# Patient Record
Sex: MALE | Race: WHITE
[De-identification: names, ages, dates, MRNs, and addresses within clinical notes are randomized per-mention and may not be internally consistent; named-entity substitution may affect disease eponyms.]

---

## 2020-05-14 ENCOUNTER — HOSPITAL ENCOUNTER (INPATIENT)
Dept: HOSPITAL 15 - ER | Age: 65
LOS: 4 days | Discharge: HOME | DRG: 183 | End: 2020-05-18
Attending: INTERNAL MEDICINE | Admitting: INTERNAL MEDICINE
Payer: COMMERCIAL

## 2020-05-14 VITALS — WEIGHT: 242.51 LBS | HEIGHT: 74 IN | BODY MASS INDEX: 31.12 KG/M2

## 2020-05-14 VITALS — DIASTOLIC BLOOD PRESSURE: 91 MMHG | SYSTOLIC BLOOD PRESSURE: 156 MMHG

## 2020-05-14 DIAGNOSIS — M48.02: ICD-10-CM

## 2020-05-14 DIAGNOSIS — Z79.899: ICD-10-CM

## 2020-05-14 DIAGNOSIS — Z90.49: ICD-10-CM
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
## 2020-05-14 DIAGNOSIS — J98.11: ICD-10-CM

## 2020-05-14 DIAGNOSIS — I10: ICD-10-CM

## 2020-05-14 DIAGNOSIS — M06.9: ICD-10-CM

## 2020-05-14 DIAGNOSIS — F17.200: ICD-10-CM

## 2020-05-14 DIAGNOSIS — S01.81XA: ICD-10-CM

## 2020-05-14 DIAGNOSIS — S22.42XA: Primary | ICD-10-CM

## 2020-05-14 DIAGNOSIS — N28.1: ICD-10-CM

## 2020-05-14 DIAGNOSIS — D75.89: ICD-10-CM

## 2020-05-14 DIAGNOSIS — Z90.81: ICD-10-CM

## 2020-05-14 DIAGNOSIS — K59.00: ICD-10-CM

## 2020-05-14 DIAGNOSIS — Z80.6: ICD-10-CM

## 2020-05-14 DIAGNOSIS — J96.00: ICD-10-CM

## 2020-05-14 DIAGNOSIS — R65.10: ICD-10-CM
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
## 2020-05-14 DIAGNOSIS — D72.829: ICD-10-CM

## 2020-05-14 LAB
ALBUMIN SERPL-MCNC: 3.6 G/DL (ref 3.4–5)
ALP SERPL-CCNC: 114 U/L (ref 45–117)
ALT SERPL-CCNC: 40 U/L (ref 16–61)
ANION GAP SERPL CALCULATED.3IONS-SCNC: 7 MMOL/L (ref 5–15)
BILIRUB SERPL-MCNC: 0.6 MG/DL (ref 0.2–1)
BUN SERPL-MCNC: 22 MG/DL (ref 7–18)
BUN/CREAT SERPL: 24.7
CALCIUM SERPL-MCNC: 8.6 MG/DL (ref 8.5–10.1)
CHLORIDE SERPL-SCNC: 109 MMOL/L (ref 98–107)
CHOLEST SERPL-MCNC: 169 MG/DL (ref ?–200)
CO2 SERPL-SCNC: 23 MMOL/L (ref 21–32)
GLUCOSE SERPL-MCNC: 103 MG/DL (ref 74–106)
HCT VFR BLD AUTO: 41 % (ref 41–53)
HDLC SERPL-MCNC: 46 MG/DL (ref 40–59)
HGB BLD-MCNC: 14.1 G/DL (ref 13.5–17.5)
MCH RBC QN AUTO: 34.8 PG (ref 28–32)
MCV RBC AUTO: 101.1 FL (ref 80–100)
NRBC BLD QL AUTO: 0.1 %
POTASSIUM SERPL-SCNC: 3.9 MMOL/L (ref 3.5–5.1)
PROT SERPL-MCNC: 7.1 G/DL (ref 6.4–8.2)
SODIUM SERPL-SCNC: 139 MMOL/L (ref 136–145)
TRIGL SERPL-MCNC: 92 MG/DL (ref ?–150)

## 2020-05-14 PROCEDURE — 80053 COMPREHEN METABOLIC PANEL: CPT

## 2020-05-14 PROCEDURE — 36415 COLL VENOUS BLD VENIPUNCTURE: CPT

## 2020-05-14 PROCEDURE — 82607 VITAMIN B-12: CPT

## 2020-05-14 PROCEDURE — 84132 ASSAY OF SERUM POTASSIUM: CPT

## 2020-05-14 PROCEDURE — 71250 CT THORAX DX C-: CPT

## 2020-05-14 PROCEDURE — 97530 THERAPEUTIC ACTIVITIES: CPT

## 2020-05-14 PROCEDURE — 83735 ASSAY OF MAGNESIUM: CPT

## 2020-05-14 PROCEDURE — 70450 CT HEAD/BRAIN W/O DYE: CPT

## 2020-05-14 PROCEDURE — 82805 BLOOD GASES W/O2 SATURATION: CPT

## 2020-05-14 PROCEDURE — 85730 THROMBOPLASTIN TIME PARTIAL: CPT

## 2020-05-14 PROCEDURE — 84484 ASSAY OF TROPONIN QUANT: CPT

## 2020-05-14 PROCEDURE — 82550 ASSAY OF CK (CPK): CPT

## 2020-05-14 PROCEDURE — 87040 BLOOD CULTURE FOR BACTERIA: CPT

## 2020-05-14 PROCEDURE — 82746 ASSAY OF FOLIC ACID SERUM: CPT

## 2020-05-14 PROCEDURE — 85025 COMPLETE CBC W/AUTO DIFF WBC: CPT

## 2020-05-14 PROCEDURE — 80048 BASIC METABOLIC PNL TOTAL CA: CPT

## 2020-05-14 PROCEDURE — 80061 LIPID PANEL: CPT

## 2020-05-14 PROCEDURE — 72125 CT NECK SPINE W/O DYE: CPT

## 2020-05-14 PROCEDURE — 71046 X-RAY EXAM CHEST 2 VIEWS: CPT

## 2020-05-14 PROCEDURE — 83036 HEMOGLOBIN GLYCOSYLATED A1C: CPT

## 2020-05-14 PROCEDURE — 84100 ASSAY OF PHOSPHORUS: CPT

## 2020-05-14 PROCEDURE — 70551 MRI BRAIN STEM W/O DYE: CPT

## 2020-05-14 PROCEDURE — 80307 DRUG TEST PRSMV CHEM ANLYZR: CPT

## 2020-05-14 PROCEDURE — 81001 URINALYSIS AUTO W/SCOPE: CPT

## 2020-05-14 PROCEDURE — 36600 WITHDRAWAL OF ARTERIAL BLOOD: CPT

## 2020-05-14 PROCEDURE — 97116 GAIT TRAINING THERAPY: CPT

## 2020-05-14 PROCEDURE — 85610 PROTHROMBIN TIME: CPT

## 2020-05-14 PROCEDURE — 93005 ELECTROCARDIOGRAM TRACING: CPT

## 2020-05-14 PROCEDURE — 97163 PT EVAL HIGH COMPLEX 45 MIN: CPT

## 2020-05-14 PROCEDURE — 84146 ASSAY OF PROLACTIN: CPT

## 2020-05-14 RX ADMIN — SODIUM CHLORIDE SCH MLS/HR: 9 INJECTION, SOLUTION INTRAVENOUS at 21:09

## 2020-05-14 RX ADMIN — SODIUM CHLORIDE SCH MLS/HR: 0.9 INJECTION, SOLUTION INTRAVENOUS at 19:15

## 2020-05-14 RX ADMIN — MORPHINE SULFATE PRN MG: 2 INJECTION, SOLUTION INTRAMUSCULAR; INTRAVENOUS at 21:07

## 2020-05-14 NOTE — NUR
Telemetry admit from ER

RADAMESRANJIT admitted to Telemetry unit after SBAR received.  Patient oriented to AURELIO CHOI, RN primary RN, west wing, 277,A, and unit policies regarding patient care 
and visiting hours. Patient now on continuous telemetry monitoring, tele box # 70 and 
telemetry reading on arrival to unit is normal sinus rhythm 72. weighed by bedscale and 
encouraged to call if they need something. All questions and concerns addressed, patient 
verbalized understanding. Will continue to monitor.

## 2020-05-15 VITALS — SYSTOLIC BLOOD PRESSURE: 140 MMHG | DIASTOLIC BLOOD PRESSURE: 71 MMHG

## 2020-05-15 VITALS — SYSTOLIC BLOOD PRESSURE: 121 MMHG | DIASTOLIC BLOOD PRESSURE: 64 MMHG

## 2020-05-15 VITALS — DIASTOLIC BLOOD PRESSURE: 76 MMHG | SYSTOLIC BLOOD PRESSURE: 134 MMHG

## 2020-05-15 VITALS — SYSTOLIC BLOOD PRESSURE: 130 MMHG | DIASTOLIC BLOOD PRESSURE: 73 MMHG

## 2020-05-15 VITALS — DIASTOLIC BLOOD PRESSURE: 72 MMHG | SYSTOLIC BLOOD PRESSURE: 141 MMHG

## 2020-05-15 LAB
ALBUMIN SERPL-MCNC: 3.4 G/DL (ref 3.4–5)
ALCOHOL, URINE: < 3 MG/DL (ref 0–5)
ALP SERPL-CCNC: 99 U/L (ref 45–117)
ALT SERPL-CCNC: 34 U/L (ref 16–61)
AMPHETAMINES UR QL SCN: NEGATIVE
ANION GAP SERPL CALCULATED.3IONS-SCNC: 3 MMOL/L (ref 5–15)
APTT PPP: 28.2 SEC (ref 23.64–32.05)
BARBITURATES UR QL SCN: NEGATIVE
BENZODIAZ UR QL SCN: NEGATIVE
BILIRUB SERPL-MCNC: 1.2 MG/DL (ref 0.2–1)
BUN SERPL-MCNC: 19 MG/DL (ref 7–18)
BUN/CREAT SERPL: 20
BZE UR QL SCN: NEGATIVE
CALCIUM SERPL-MCNC: 8.2 MG/DL (ref 8.5–10.1)
CANNABINOIDS UR QL SCN: NEGATIVE
CHLORIDE SERPL-SCNC: 108 MMOL/L (ref 98–107)
CO2 SERPL-SCNC: 26 MMOL/L (ref 21–32)
GLUCOSE SERPL-MCNC: 103 MG/DL (ref 74–106)
HCT VFR BLD AUTO: 40.3 % (ref 41–53)
HGB BLD-MCNC: 13.5 G/DL (ref 13.5–17.5)
INR PPP: 1.03 (ref 0.9–1.15)
MAGNESIUM SERPL-MCNC: 2.4 MG/DL (ref 1.6–2.6)
MCH RBC QN AUTO: 34 PG (ref 28–32)
MCV RBC AUTO: 101.8 FL (ref 80–100)
NRBC BLD QL AUTO: 0 %
OPIATES UR QL SCN: POSITIVE
PCP UR QL SCN: NEGATIVE
POTASSIUM SERPL-SCNC: 3.9 MMOL/L (ref 3.5–5.1)
PROT SERPL-MCNC: 6.8 G/DL (ref 6.4–8.2)
SODIUM SERPL-SCNC: 137 MMOL/L (ref 136–145)

## 2020-05-15 RX ADMIN — SODIUM CHLORIDE SCH MLS/HR: 0.9 INJECTION, SOLUTION INTRAVENOUS at 10:15

## 2020-05-15 RX ADMIN — SODIUM CHLORIDE SCH MLS/HR: 0.9 INJECTION, SOLUTION INTRAVENOUS at 05:09

## 2020-05-15 RX ADMIN — SODIUM CHLORIDE SCH MLS/HR: 9 INJECTION, SOLUTION INTRAVENOUS at 01:28

## 2020-05-15 RX ADMIN — SODIUM CHLORIDE SCH MLS/HR: 9 INJECTION, SOLUTION INTRAVENOUS at 08:33

## 2020-05-15 RX ADMIN — ONDANSETRON HYDROCHLORIDE PRN MG: 2 INJECTION, SOLUTION INTRAMUSCULAR; INTRAVENOUS at 17:40

## 2020-05-15 RX ADMIN — DOCUSATE SODIUM PRN MG: 100 CAPSULE, LIQUID FILLED ORAL at 21:48

## 2020-05-15 RX ADMIN — SODIUM CHLORIDE SCH MLS/HR: 9 INJECTION, SOLUTION INTRAVENOUS at 17:41

## 2020-05-15 RX ADMIN — MORPHINE SULFATE PRN MG: 2 INJECTION, SOLUTION INTRAMUSCULAR; INTRAVENOUS at 19:19

## 2020-05-15 RX ADMIN — ONDANSETRON HYDROCHLORIDE PRN MG: 2 INJECTION, SOLUTION INTRAMUSCULAR; INTRAVENOUS at 23:25

## 2020-05-15 RX ADMIN — HYDROCODONE BITARTRATE AND ACETAMINOPHEN PRN TAB: 5; 325 TABLET ORAL at 13:15

## 2020-05-15 RX ADMIN — MORPHINE SULFATE PRN MG: 2 INJECTION, SOLUTION INTRAMUSCULAR; INTRAVENOUS at 23:20

## 2020-05-15 RX ADMIN — HYDROCODONE BITARTRATE AND ACETAMINOPHEN PRN TAB: 5; 325 TABLET ORAL at 17:40

## 2020-05-15 RX ADMIN — SODIUM CHLORIDE SCH MLS/HR: 9 INJECTION, SOLUTION INTRAVENOUS at 20:52

## 2020-05-15 RX ADMIN — MORPHINE SULFATE PRN MG: 2 INJECTION, SOLUTION INTRAMUSCULAR; INTRAVENOUS at 01:18

## 2020-05-15 RX ADMIN — MORPHINE SULFATE PRN MG: 2 INJECTION, SOLUTION INTRAMUSCULAR; INTRAVENOUS at 05:46

## 2020-05-15 RX ADMIN — HYDROCODONE BITARTRATE AND ACETAMINOPHEN PRN TAB: 5; 325 TABLET ORAL at 21:47

## 2020-05-15 RX ADMIN — MORPHINE SULFATE PRN MG: 2 INJECTION, SOLUTION INTRAMUSCULAR; INTRAVENOUS at 10:30

## 2020-05-15 RX ADMIN — ENOXAPARIN SODIUM SCH MG: 40 INJECTION SUBCUTANEOUS at 08:33

## 2020-05-15 RX ADMIN — SODIUM CHLORIDE SCH MLS/HR: 0.9 INJECTION, SOLUTION INTRAVENOUS at 00:50

## 2020-05-15 RX ADMIN — MORPHINE SULFATE PRN MG: 2 INJECTION, SOLUTION INTRAMUSCULAR; INTRAVENOUS at 15:03

## 2020-05-15 NOTE — NUR
IV removal

Left hand IV DC'd with clean sterile technique, catheter fully intact. Pressure dressing 
applied to site. Patient tolerated well.



NEW IV

New 22G IV placed on the right hand, after one attempt, patient tolerated well. No trauma to 
site. Will continue to monitor.

## 2020-05-15 NOTE — NUR
Opening Shift Note

Assumed care of patient, awake and alert, oriented x 4, clear speech, follows direction. On 
oxygen at 2L via NC with even and unlabored respirations. Patient was able to return 
demonstration with proper use of incentive spirometer, 1500ml inspire volume. No S/S of 
distress/SOB.Steri-strips to left lateral eye CDI. Bed in lowest locked position with side 
rails up x 2 and call light within reach. Instructed on POC and to call for assist PRN, will 
continue to monitor for changes Q1hr and PRN.

## 2020-05-15 NOTE — NUR
Pain

Patient complaining of generalized pain in body but primarily pain focused on left thoracic 
area. Per patient pain is constant, stabbing, and pressure like. Patient rates pain 6/10. 
Patient has been repositioned for comfort.[place] 5/352 PO given for pain, will reassess pain 
in one hour.

## 2020-05-15 NOTE — NUR
Closing note



Patient alert and orientated x4. No SOB or distress noted. Endorsed care to Day shift RN.

## 2020-05-15 NOTE — NUR
Assumed care

Patient in bed AOx4, no s/s of distress or SOB noted. Patient updated on POC and to call for 
assistance as needed, all questions and concerns addressed, patient verbalized 
understanding. Will continue care.

## 2020-05-15 NOTE — NUR
Pain

Patient complaining of generalized pain in body but primarily pain focused on left thoracic 
area. Per patient pain is constant, stabbing, and pressure like. Patient rates pain 10/10. 
Patient has been repositioned for comfort. Morphine 1mg IV given for pain, will reassess 
pain in one hour.

## 2020-05-16 VITALS — DIASTOLIC BLOOD PRESSURE: 89 MMHG | SYSTOLIC BLOOD PRESSURE: 158 MMHG

## 2020-05-16 VITALS — SYSTOLIC BLOOD PRESSURE: 124 MMHG | DIASTOLIC BLOOD PRESSURE: 64 MMHG

## 2020-05-16 VITALS — DIASTOLIC BLOOD PRESSURE: 74 MMHG | SYSTOLIC BLOOD PRESSURE: 138 MMHG

## 2020-05-16 VITALS — SYSTOLIC BLOOD PRESSURE: 142 MMHG | DIASTOLIC BLOOD PRESSURE: 79 MMHG

## 2020-05-16 VITALS — SYSTOLIC BLOOD PRESSURE: 158 MMHG | DIASTOLIC BLOOD PRESSURE: 89 MMHG

## 2020-05-16 VITALS — DIASTOLIC BLOOD PRESSURE: 69 MMHG | SYSTOLIC BLOOD PRESSURE: 135 MMHG

## 2020-05-16 LAB
ANION GAP SERPL CALCULATED.3IONS-SCNC: 6 MMOL/L (ref 5–15)
BUN SERPL-MCNC: 15 MG/DL (ref 7–18)
BUN/CREAT SERPL: 23.1
CALCIUM SERPL-MCNC: 8.4 MG/DL (ref 8.5–10.1)
CHLORIDE SERPL-SCNC: 106 MMOL/L (ref 98–107)
CO2 SERPL-SCNC: 24 MMOL/L (ref 21–32)
GLUCOSE SERPL-MCNC: 131 MG/DL (ref 74–106)
HCT VFR BLD AUTO: 42.8 % (ref 41–53)
HGB BLD-MCNC: 13.9 G/DL (ref 13.5–17.5)
MAGNESIUM SERPL-MCNC: 2.4 MG/DL (ref 1.6–2.6)
MCH RBC QN AUTO: 33.5 PG (ref 28–32)
MCV RBC AUTO: 103 FL (ref 80–100)
NRBC BLD QL AUTO: 0 %
POTASSIUM SERPL-SCNC: 3.6 MMOL/L (ref 3.5–5.1)
SODIUM SERPL-SCNC: 136 MMOL/L (ref 136–145)

## 2020-05-16 RX ADMIN — SODIUM CHLORIDE SCH MLS/HR: 9 INJECTION, SOLUTION INTRAVENOUS at 14:15

## 2020-05-16 RX ADMIN — Medication SCH ML: at 23:02

## 2020-05-16 RX ADMIN — ONDANSETRON HYDROCHLORIDE PRN MG: 2 INJECTION, SOLUTION INTRAMUSCULAR; INTRAVENOUS at 17:03

## 2020-05-16 RX ADMIN — CYANOCOBALAMIN SCH MCG: 1000 INJECTION, SOLUTION INTRAMUSCULAR at 10:15

## 2020-05-16 RX ADMIN — MORPHINE SULFATE PRN MG: 2 INJECTION, SOLUTION INTRAMUSCULAR; INTRAVENOUS at 04:18

## 2020-05-16 RX ADMIN — SODIUM CHLORIDE SCH MLS/HR: 9 INJECTION, SOLUTION INTRAVENOUS at 02:31

## 2020-05-16 RX ADMIN — SODIUM CHLORIDE SCH MLS/HR: 9 INJECTION, SOLUTION INTRAVENOUS at 08:58

## 2020-05-16 RX ADMIN — Medication SCH ML: at 18:51

## 2020-05-16 RX ADMIN — MORPHINE SULFATE PRN MG: 2 INJECTION, SOLUTION INTRAMUSCULAR; INTRAVENOUS at 08:49

## 2020-05-16 RX ADMIN — HYDROCODONE BITARTRATE AND ACETAMINOPHEN PRN TAB: 5; 325 TABLET ORAL at 17:08

## 2020-05-16 RX ADMIN — ENOXAPARIN SODIUM SCH MG: 40 INJECTION SUBCUTANEOUS at 10:08

## 2020-05-16 RX ADMIN — DOCUSATE SODIUM PRN MG: 100 CAPSULE, LIQUID FILLED ORAL at 21:02

## 2020-05-16 RX ADMIN — MORPHINE SULFATE PRN MG: 2 INJECTION, SOLUTION INTRAMUSCULAR; INTRAVENOUS at 14:32

## 2020-05-16 RX ADMIN — ONDANSETRON HYDROCHLORIDE PRN MG: 2 INJECTION, SOLUTION INTRAMUSCULAR; INTRAVENOUS at 04:17

## 2020-05-16 NOTE — NUR
Incentive spirometer (IS) education provided. Patient educated to use IS 10 times per hour 
while awake. Patient verbalized understanding and performed return demonstration. Patient 
able to reach 1600 ml.

## 2020-05-16 NOTE — NUR
Closing Note

patient resting in bed with oxygen on, even and unlabored respirations, noted chest rise and 
fall. No s/s of distress noted. Bed in lowest locked position with side rails up x 2 and 
call light within reach. Endorsed care to day shift RN.

## 2020-05-16 NOTE — NUR
Opening Shift Note



Assumed care of patient, awake and alert.  No S/S of distress/SOB. Patient is on 3 liters of 
oxygen via nasal cannula. Respirations even and unlabored. Patient reports 9/10 pain on left 
side of ribs. Patient has no PRN pain medication available for severe pain 7-10 and Norco 
available for moderate pain not due. Will contact MD. Instructed on POC and to call for 
assist PRN, will continue to monitor for changes Q1hr and PRN.

## 2020-05-16 NOTE — NUR
Received callback from Dr. Yo regarding patient requesting pain medication for 9/10 pain 
on left side of his ribs but no PRN pain medication available for severe pain. New order 
received: Morphine 2 mg IV Q4HR PRN for severe pain 7-10.

## 2020-05-16 NOTE — NUR
Patient's wife Lilibeth called for update.Correct password provided. Update provided. Per 
patient's wife she has questions regarding medication reactions, follow up appointments, and 
insurance coverage. Per patient's wife she would like  to be aware of patient's 
worker's compensation phone number [phone] and claim number of [id number]. Will 
endorse to day shift RN.

## 2020-05-16 NOTE — NUR
Opening Shift Note

Assumed care of patient, awake and alert.  No S/S of distress/SOB or pain.  Instructed on 
POC and to call for assist PRN, will continue to monitor for changes Q1hr and PRN.  Patient 
currently going down to radiology for procedure.

-------------------------------------------------------------------------------

Signed:    05/16/20 at 0754 by KASANDRA STRONG SN <Co-Signature Required>

Co-Signed: 05/16/20 at 0754 by EDUARDO PLASCENCIA RN RN

-------------------------------------------------------------------------------

## 2020-05-16 NOTE — NUR
Hospitalist paged regarding patient requesting pain medication for 9/10 pain on left side of 
his ribs but no PRN pain medication available for severe pain.

## 2020-05-17 VITALS — SYSTOLIC BLOOD PRESSURE: 143 MMHG | DIASTOLIC BLOOD PRESSURE: 77 MMHG

## 2020-05-17 VITALS — SYSTOLIC BLOOD PRESSURE: 153 MMHG | DIASTOLIC BLOOD PRESSURE: 96 MMHG

## 2020-05-17 VITALS — DIASTOLIC BLOOD PRESSURE: 83 MMHG | SYSTOLIC BLOOD PRESSURE: 149 MMHG

## 2020-05-17 VITALS — SYSTOLIC BLOOD PRESSURE: 160 MMHG | DIASTOLIC BLOOD PRESSURE: 97 MMHG

## 2020-05-17 VITALS — SYSTOLIC BLOOD PRESSURE: 152 MMHG | DIASTOLIC BLOOD PRESSURE: 81 MMHG

## 2020-05-17 VITALS — SYSTOLIC BLOOD PRESSURE: 139 MMHG | DIASTOLIC BLOOD PRESSURE: 87 MMHG

## 2020-05-17 LAB
HCT VFR BLD AUTO: 41.6 % (ref 41–53)
HGB BLD-MCNC: 13.7 G/DL (ref 13.5–17.5)
MCH RBC QN AUTO: 33.8 PG (ref 28–32)
MCV RBC AUTO: 102.7 FL (ref 80–100)
NRBC BLD QL AUTO: 0.1 %

## 2020-05-17 RX ADMIN — HYDROCODONE BITARTRATE AND ACETAMINOPHEN PRN TAB: 5; 325 TABLET ORAL at 05:36

## 2020-05-17 RX ADMIN — HYDROCODONE BITARTRATE AND ACETAMINOPHEN PRN TAB: 5; 325 TABLET ORAL at 14:32

## 2020-05-17 RX ADMIN — DOCUSATE SODIUM PRN MG: 100 CAPSULE, LIQUID FILLED ORAL at 09:32

## 2020-05-17 RX ADMIN — Medication SCH ML: at 12:01

## 2020-05-17 RX ADMIN — Medication SCH ML: at 17:12

## 2020-05-17 RX ADMIN — Medication SCH ML: at 05:34

## 2020-05-17 RX ADMIN — HYDROCODONE BITARTRATE AND ACETAMINOPHEN PRN TAB: 5; 325 TABLET ORAL at 10:26

## 2020-05-17 RX ADMIN — HYDROCODONE BITARTRATE AND ACETAMINOPHEN PRN TAB: 5; 325 TABLET ORAL at 00:54

## 2020-05-17 RX ADMIN — ENOXAPARIN SODIUM SCH MG: 40 INJECTION SUBCUTANEOUS at 09:26

## 2020-05-17 RX ADMIN — HYDROCODONE BITARTRATE AND ACETAMINOPHEN PRN TAB: 5; 325 TABLET ORAL at 18:33

## 2020-05-17 RX ADMIN — CYANOCOBALAMIN SCH MCG: 1000 INJECTION, SOLUTION INTRAMUSCULAR at 09:26

## 2020-05-17 NOTE — NUR
CLOSING NOTE 



No S/S of distress/SOB or pain. Patient is on 3 liters of oxygen via nasal cannula. 
Respirations even and unlabored.

## 2020-05-17 NOTE — NUR
Hospitalist paged regarding patient requesting medication for constipation. Awaiting 
callback at this time.

## 2020-05-17 NOTE — NUR
Received callback from Dr. Yo regarding patient requesting medication for constipation. 
New order received: tap water enema.

## 2020-05-17 NOTE — NUR
Received call from patient's wife Lilibeth. Correct password provided. Update provided. Per 
patient's wife the doctor wanted to know patient's previous white blood cell count. Per 
patient's wife patient's white blood cell count on 02/22/20 was 8.9 taken at [facility].

## 2020-05-17 NOTE — NUR
Patient's BP reassessed after scheduled BP medication Procardia 30 mg PO capsule given. BP 
is 139/87. Patient asymptomatic.

## 2020-05-17 NOTE — NUR
Opening Shift Note



Assumed care of patient, awake and alert.  No S/S of distress/SOB. Patient is on 3 liters of 
oxygen via nasal cannula. Respirations even and unlabored. Patient reports 6/10 pain on the 
left side of his ribs. Patient informed that Norco is not due at this time. Patient 
verbalized understanding. Relaxation techniques used to relieve pain. Instructed on POC and 
to call for assist PRN, will continue to monitor for changes Q1hr and PRN.

## 2020-05-18 VITALS — SYSTOLIC BLOOD PRESSURE: 156 MMHG | DIASTOLIC BLOOD PRESSURE: 95 MMHG

## 2020-05-18 VITALS — DIASTOLIC BLOOD PRESSURE: 77 MMHG | SYSTOLIC BLOOD PRESSURE: 133 MMHG

## 2020-05-18 VITALS — DIASTOLIC BLOOD PRESSURE: 84 MMHG | SYSTOLIC BLOOD PRESSURE: 146 MMHG

## 2020-05-18 VITALS — DIASTOLIC BLOOD PRESSURE: 92 MMHG | SYSTOLIC BLOOD PRESSURE: 154 MMHG

## 2020-05-18 LAB
ANION GAP SERPL CALCULATED.3IONS-SCNC: 6 MMOL/L (ref 5–15)
BUN SERPL-MCNC: 16 MG/DL (ref 7–18)
BUN/CREAT SERPL: 23.2
CALCIUM SERPL-MCNC: 8.4 MG/DL (ref 8.5–10.1)
CHLORIDE SERPL-SCNC: 107 MMOL/L (ref 98–107)
CO2 SERPL-SCNC: 24 MMOL/L (ref 21–32)
GLUCOSE SERPL-MCNC: 98 MG/DL (ref 74–106)
HCT VFR BLD AUTO: 40.7 % (ref 41–53)
HGB BLD-MCNC: 13.5 G/DL (ref 13.5–17.5)
MCH RBC QN AUTO: 33.9 PG (ref 28–32)
MCV RBC AUTO: 101.7 FL (ref 80–100)
NRBC BLD QL AUTO: 0.1 %
POTASSIUM SERPL-SCNC: 3.5 MMOL/L (ref 3.5–5.1)
SODIUM SERPL-SCNC: 137 MMOL/L (ref 136–145)

## 2020-05-18 RX ADMIN — Medication SCH ML: at 11:02

## 2020-05-18 RX ADMIN — HYDROCODONE BITARTRATE AND ACETAMINOPHEN PRN TAB: 5; 325 TABLET ORAL at 15:26

## 2020-05-18 RX ADMIN — Medication SCH ML: at 18:00

## 2020-05-18 RX ADMIN — HYDROCODONE BITARTRATE AND ACETAMINOPHEN PRN TAB: 5; 325 TABLET ORAL at 01:24

## 2020-05-18 RX ADMIN — ENOXAPARIN SODIUM SCH MG: 40 INJECTION SUBCUTANEOUS at 09:53

## 2020-05-18 RX ADMIN — HYDROCODONE BITARTRATE AND ACETAMINOPHEN PRN TAB: 5; 325 TABLET ORAL at 06:25

## 2020-05-18 RX ADMIN — HYDROCODONE BITARTRATE AND ACETAMINOPHEN PRN TAB: 5; 325 TABLET ORAL at 11:03

## 2020-05-18 RX ADMIN — CYANOCOBALAMIN SCH MCG: 1000 INJECTION, SOLUTION INTRAMUSCULAR at 09:54

## 2020-05-18 RX ADMIN — Medication SCH ML: at 00:25

## 2020-05-18 RX ADMIN — Medication SCH ML: at 06:24

## 2020-05-18 NOTE — NUR
Patient medicated with Norco for 7/10 pain on left side of ribs per patient request. Patient 
offered morphine for severe pain. Patient refuses morphine.

## 2020-05-18 NOTE — NUR
Nutrition Assessment Notes



please see attached link fo complete assessment



[employer] needs ABW 98 [id number] kcals (23-25 kcal/kgBW), Est Protein needs:  
gms/day (1.0-1.1 gm/kgBW). Will continue to monitor and reassess prn. 


-------------------------------------------------------------------------------

Addendum: 20 at 1508 by Imelda Yee RD

-------------------------------------------------------------------------------

Amended: Links added.

## 2020-05-18 NOTE — NUR
1500 5/18/20 I contacted [facility]  (no return call from her) Zeh[employer] to 
request an update on the status of the home oxygen-she stated she couldn't find a vendor 
that would deliver up here.  I faxed home oxygen request to [facility]-I provided Zehra with 
their phone number so she can provide them with authorization.

## 2020-05-18 NOTE — NUR
Oxygen Delivered

Oxygen delivered to bedside at this time. Educated patient on how to use oxygen. Patient is 
currently refusing to wear. Patient states he will wear it in the car.

## 2020-05-18 NOTE — NUR
I called [employer] [phone] and spoke with Teresa, she said portable oxygen tank will be 
delivered to hospital by 1830.  I relayed this information to nurse Rodriguez.

## 2020-05-18 NOTE — NUR
Discharge

Discharge instructions given as ordered. Encourage to follow up with PMD as instructed. All 
questions and concerns addressed. Patient verbalized understanding.  Oxygen taken with 
patient, however, patient does not want to wear oxygen at this time. Patient states, "I will 
put in on in the car." Medication reconciliation form completed and copy given to patient. 
IV removed with catheter intact, pressure dressing applied.  Telemetry unit returned to ICU. 
Patient taken to vehicle via wheelchair with all personal belongings, accompanied by staff. 
Wife in lobby to  patient. No distress noted at time of departure.

## 2020-05-18 NOTE — NUR
Dr. Yo called back regarding patient requesting medication for nausea but no PRN 
medication available for nausea. New order received: Zofran 4 mg IV Q4HR PRN for 
nausea/vomiting.

## 2020-05-18 NOTE — NUR
Antibiotics

Spoke with patient. Despite section of discharge that states to continue antibiotics, 
patient will not go home on antibiotics. Spoke with Dr. Andres and there will are no 
antibiotics needed. Patient aware.

## 2020-05-18 NOTE — NUR
Hospitalist paged regarding patient requesting medication for nausea but no PRN medication 
available for nausea. Awaiting callback at this time.

## 2020-05-18 NOTE — NUR
Opening Shift Note

Assuming care of patient at this time. Patient is awake and alert. Patient denies pain at 
this time. Patient shows no signs or symptoms of distress or shortness of breath. Bed is 
locked and lowered with side rails up x2. Instructed patient on the plan of care for today 
and to call for assistance as needed. Call light within reach. Will continue to round hourly 
and as needed.

## 2020-05-18 NOTE — NUR
I called [facility]  Zehra Culp [phone] regarding home oxygen order.  
Faxed her the order to [phone] as well as the [employer]-she will forward it to her vendor and 
call me back regarding delivery time.

## 2020-05-18 NOTE — NUR
Discharge Photos

Discharge photos taken at this time of patient's laceration to left eye that was present on 
admission.